# Patient Record
Sex: MALE | Race: AMERICAN INDIAN OR ALASKA NATIVE
[De-identification: names, ages, dates, MRNs, and addresses within clinical notes are randomized per-mention and may not be internally consistent; named-entity substitution may affect disease eponyms.]

---

## 2020-02-23 ENCOUNTER — HOSPITAL ENCOUNTER (EMERGENCY)
Dept: HOSPITAL 5 - ED | Age: 25
Discharge: HOME | End: 2020-02-23
Payer: COMMERCIAL

## 2020-02-23 VITALS — SYSTOLIC BLOOD PRESSURE: 129 MMHG | DIASTOLIC BLOOD PRESSURE: 94 MMHG

## 2020-02-23 DIAGNOSIS — R53.1: ICD-10-CM

## 2020-02-23 DIAGNOSIS — R11.2: Primary | ICD-10-CM

## 2020-02-23 DIAGNOSIS — Z79.899: ICD-10-CM

## 2020-02-23 DIAGNOSIS — R42: ICD-10-CM

## 2020-02-23 LAB
ALBUMIN SERPL-MCNC: 4.4 G/DL (ref 3.9–5)
ALT SERPL-CCNC: 59 UNITS/L (ref 7–56)
BILIRUB UR QL STRIP: (no result)
BLOOD UR QL VISUAL: (no result)
BUN SERPL-MCNC: 11 MG/DL (ref 9–20)
BUN/CREAT SERPL: 12 %
CALCIUM SERPL-MCNC: 9.8 MG/DL (ref 8.4–10.2)
HCT VFR BLD CALC: 46.3 % (ref 35.5–45.6)
HEMOLYSIS INDEX: 20
HGB BLD-MCNC: 15.8 GM/DL (ref 11.8–15.2)
MCHC RBC AUTO-ENTMCNC: 34 % (ref 32–34)
MCV RBC AUTO: 90 FL (ref 84–94)
PH UR STRIP: 7 [PH] (ref 5–7)
PLATELET # BLD: 296 K/MM3 (ref 140–440)
PROT UR STRIP-MCNC: (no result) MG/DL
RBC # BLD AUTO: 5.15 M/MM3 (ref 3.65–5.03)
RBC #/AREA URNS HPF: 1 /HPF (ref 0–6)
UROBILINOGEN UR-MCNC: < 2 MG/DL (ref ?–2)
WBC #/AREA URNS HPF: < 1 /HPF (ref 0–6)

## 2020-02-23 PROCEDURE — 96374 THER/PROPH/DIAG INJ IV PUSH: CPT

## 2020-02-23 PROCEDURE — 36415 COLL VENOUS BLD VENIPUNCTURE: CPT

## 2020-02-23 PROCEDURE — 74022 RADEX COMPL AQT ABD SERIES: CPT

## 2020-02-23 PROCEDURE — 96361 HYDRATE IV INFUSION ADD-ON: CPT

## 2020-02-23 PROCEDURE — 81001 URINALYSIS AUTO W/SCOPE: CPT

## 2020-02-23 PROCEDURE — 83690 ASSAY OF LIPASE: CPT

## 2020-02-23 PROCEDURE — 99284 EMERGENCY DEPT VISIT MOD MDM: CPT

## 2020-02-23 PROCEDURE — 85025 COMPLETE CBC W/AUTO DIFF WBC: CPT

## 2020-02-23 PROCEDURE — 80053 COMPREHEN METABOLIC PANEL: CPT

## 2020-02-23 NOTE — EMERGENCY DEPARTMENT REPORT
ED N/V/D HPI





- General


Chief complaint: Nausea/Vomiting/Diarrhea


Stated complaint: VOMIT LAST 3HOURS/WEAK


Time Seen by Provider: 02/23/20 09:52


Source: patient


Mode of arrival: Ambulatory


Limitations: No Limitations





- History of Present Illness


Initial comments: 





This is a 24-year-old male nontoxic, well nourished in appearance, no acute 

signs of distress presents to the ED with c/o of nausea and vomiting 1 day.  

Patient stated this morning he started to have some dizziness.  Patient 

describes vomiting as food content.  Patient denies any abdominal pain, chest 

pain, short of breath, fever, chills, headache, stiff neck, numbness or 

tingling.  Patient denies any diarrhea or constipation.  Patient denies any 

recent travels.  Patient denies any drug allergies significant past medical 

history.


MD complaint: nausea, vomiting, other (dizziness)


-: days(s)


Associated Abdominal Pain: No


Radiation: none


Severity: mild


Pain Scale: 0


Improves with: none


Worsens with: none


Associated Symptoms: nausea/vomiting.  denies: myalgias, chest pain, cough, 

diaphoresis, fever/chills, headaches, loss of appetite, malaise, rash, dysuria, 

shortness of breath, syncope, weakness





- Related Data


                                  Previous Rx's











 Medication  Instructions  Recorded  Last Taken  Type


 


Amoxicillin [Trimox CAP] 500 mg PO BID #20 capsule 05/24/15 Unknown Rx


 


Ibuprofen [Motrin] 800 mg PO Q8H PRN #21 tablet 05/24/15 Unknown Rx


 


Ondansetron [Zofran Odt] 4 mg PO Q8HR PRN #20 tab.rapdis 02/23/20 Unknown Rx











                                    Allergies











Allergy/AdvReac Type Severity Reaction Status Date / Time


 


No Known Allergies Allergy   Unverified 05/24/15 13:27














ED Review of Systems


ROS: 


Stated complaint: VOMIT LAST 3HOURS/WEAK


Other details as noted in HPI





Constitutional: denies: chills, fever


Eyes: denies: eye pain, eye discharge, vision change


ENT: denies: ear pain, throat pain


Respiratory: denies: cough, shortness of breath, wheezing


Cardiovascular: denies: chest pain, palpitations


Endocrine: no symptoms reported


Gastrointestinal: nausea, vomiting.  denies: abdominal pain, diarrhea, cons

tipation, hematemesis, melena, hematochezia


Genitourinary: denies: urgency, dysuria


Musculoskeletal: denies: back pain, joint swelling, arthralgia


Skin: denies: rash, lesions


Neurological: denies: headache, weakness, paresthesias


Psychiatric: denies: anxiety, depression


Hematological/Lymphatic: denies: easy bleeding, easy bruising





ED Past Medical Hx





- Past Medical History


Previous Medical History?: No





- Surgical History


Past Surgical History?: No





- Social History


Smoking Status: Never Smoker


Substance Use Type: None





- Medications


Home Medications: 


                                Home Medications











 Medication  Instructions  Recorded  Confirmed  Last Taken  Type


 


Amoxicillin [Trimox CAP] 500 mg PO BID #20 capsule 05/24/15  Unknown Rx


 


Ibuprofen [Motrin] 800 mg PO Q8H PRN #21 tablet 05/24/15  Unknown Rx


 


Ondansetron [Zofran Odt] 4 mg PO Q8HR PRN #20 tab.rapdis 02/23/20  Unknown Rx














ED Physical Exam





- General


Limitations: No Limitations


General appearance: alert, in no apparent distress





- Head


Head exam: Present: atraumatic, normocephalic





- Neck


Neck exam: Present: normal inspection, full ROM.  Absent: tenderness, 

meningismus, lymphadenopathy





- Respiratory


Respiratory exam: Present: normal lung sounds bilaterally.  Absent: respiratory 

distress, wheezes, rales, rhonchi, stridor, chest wall tenderness, accessory 

muscle use, decreased breath sounds, prolonged expiratory





- Cardiovascular


Cardiovascular Exam: Present: regular rate, normal rhythm, normal heart sounds. 

Absent: bradycardia, tachycardia, irregular rhythm, systolic murmur, diastolic 

murmur, rubs, gallop





- GI/Abdominal


GI/Abdominal exam: Present: soft, normal bowel sounds.  Absent: distended, tende

rness, guarding, rebound, rigid, diminished bowel sounds





- Extremities Exam


Extremities exam: Present: normal inspection, full ROM





- Back Exam


Back exam: Present: normal inspection, full ROM.  Absent: tenderness, CVA tende

rness (R), CVA tenderness (L), muscle spasm, paraspinal tenderness, vertebral 

tenderness, rash noted





- Neurological Exam


Neurological exam: Present: alert, oriented X3, normal gait





- Expanded Neurological Exam


  ** Expanded


Patient oriented to: Present: person, place, time


Cerebellar function: Finger to Nose: Normal


Upper motor neuron: Pronator Drift: Normal, Sensory Extinction: Normal


Motor strength exam: RUE: 5, LUE: 5, RLE: 5, LLE: 5


Best Eye Response ([place]): (4) open spontaneously


Best Motor Response ([place]): (6) obeys commands


Best Verbal Response ([place]ya[place]): (5) oriented


[place] Total: 15





- Psychiatric


Psychiatric exam: Present: normal affect, normal mood





- Skin


Skin exam: Present: warm, dry, intact, normal color.  Absent: rash





ED Course


                                   Vital Signs











  02/23/20





  07:47


 


Temperature 98.4 F


 


Pulse Rate 72


 


Respiratory 20





Rate 


 


Blood Pressure 152/92


 


O2 Sat by Pulse 100





Oximetry 














- Reevaluation(s)


Reevaluation #1: 





02/23/20 10:20


Patient is speaking in full sentences with no signs of distress noted.





ED Medical Decision Making





- Lab Data


Result diagrams: 


                                 02/23/20 08:23





                                 02/23/20 08:23





- Medical Decision Making





This is a 24-year-old male that presents with dizziness, nausea and vomiting.  

Patient is stable and was examined by me. There is no abdominal tenderness. 

Negative signs of symptoms of appendicitis, cholecystitis or acute abdomen.  

Labs obtained. UA obtained. Xr abdomen/chest xray obtained and dictated by the 

radiologist. Patient is notified of the report with no questions noted by the 

patient. Vital signs are stable prior to discharge.  Patient received Zofran and

1L Normal saline in the ED which patient stated symptoms has resovled and 

subsided.  A by mouth challenge has been obtained and patient tolerated well 

with no nausea vomiting.   Patient was also instructed to Follow-up with a 

primary care doctor in 3-5 days or if symptoms worsen and continue return to 

emergency room as soon as possible.  At time of discharge, the patient does not 

seem toxic or ill in appearance.  No acute signs of distress noted.  Patient ag

briana to discharge treatment plan of care.  No further questions noted by the 

patient.


Critical care attestation.: 


If time is entered above; I have spent that time in minutes in the direct care 

of this critically ill patient, excluding procedure time.








ED Disposition


Clinical Impression: 


 Dizziness





Nausea & vomiting


Qualifiers:


 Vomiting type: unspecified Vomiting Intractability: non-intractable Qualified 

Code(s): R11.2 - Nausea with vomiting, unspecified





Disposition: DC-01 TO HOME OR SELFCARE


Is pt being admited?: No


Does the pt Need Aspirin: No


Condition: Stable


Instructions:  Acute Nausea and Vomiting (ED), Dizziness (ED)


Additional Instructions: 


Follow-up with a primary care doctor in 3-5 days or if symptoms worsen and 

continue return to emergency room as soon as possible. 


Prescriptions: 


Ondansetron [Zofran Odt] 4 mg PO Q8HR PRN #20 tab.rapdis


 PRN Reason: Nausea


Referrals: 


PRIMARY CARE,MD [Primary Care Provider] - 3-5 Days


JENNIFER DONG MD [Staff Physician] - 3-5 Days


[facility] [Outside] - 3-5 Days


Forms:  Work/School Release Form(ED)

## 2020-02-23 NOTE — XRAY REPORT
ABDOMEN 3 VIEW(S) WITH CHEST IMAGING



INDICATION / CLINICAL INFORMATION:

n/v.



COMPARISON: 

None available.



FINDINGS:



HEART / MEDIASTINUM: No significant abnormality.

LUNGS / PLEURA: No significant pulmonary or pleural abnormality. No pneumothorax

BOWEL: No dilated bowel.

FREE AIR / EXTRALUMINAL GAS: None seen.

CALCIFICATIONS: No significant abnormal calcifications. 



ADDITIONAL FINDINGS: None.



LUNGS: Visualized lungs show no significant abnormality.



SKELETAL STRUCTURES: No significant abnormality.



IMPRESSION:

1. No significant abnormality.



Signer Name: Wyatt Braden MD 

Signed: 2/23/2020 11:27 AM

 Workstation Name: [id number]